# Patient Record
Sex: FEMALE | Race: BLACK OR AFRICAN AMERICAN | NOT HISPANIC OR LATINO | Employment: FULL TIME | URBAN - METROPOLITAN AREA
[De-identification: names, ages, dates, MRNs, and addresses within clinical notes are randomized per-mention and may not be internally consistent; named-entity substitution may affect disease eponyms.]

---

## 2024-02-16 ENCOUNTER — OFFICE VISIT (OUTPATIENT)
Dept: OBSTETRICS AND GYNECOLOGY | Facility: CLINIC | Age: 34
End: 2024-02-16
Payer: COMMERCIAL

## 2024-02-16 ENCOUNTER — LAB VISIT (OUTPATIENT)
Dept: LAB | Facility: HOSPITAL | Age: 34
End: 2024-02-16
Attending: NURSE PRACTITIONER
Payer: COMMERCIAL

## 2024-02-16 VITALS
WEIGHT: 129.19 LBS | DIASTOLIC BLOOD PRESSURE: 72 MMHG | BODY MASS INDEX: 22.06 KG/M2 | HEIGHT: 64 IN | SYSTOLIC BLOOD PRESSURE: 122 MMHG

## 2024-02-16 DIAGNOSIS — Z01.419 ROUTINE GYNECOLOGICAL EXAMINATION: Primary | ICD-10-CM

## 2024-02-16 DIAGNOSIS — Z12.4 PAPANICOLAOU SMEAR FOR CERVICAL CANCER SCREENING: ICD-10-CM

## 2024-02-16 DIAGNOSIS — Z30.014 ENCOUNTER FOR INITIAL PRESCRIPTION OF INTRAUTERINE CONTRACEPTIVE DEVICE (IUD): ICD-10-CM

## 2024-02-16 DIAGNOSIS — Z11.3 SCREEN FOR STD (SEXUALLY TRANSMITTED DISEASE): ICD-10-CM

## 2024-02-16 PROCEDURE — 3008F BODY MASS INDEX DOCD: CPT | Mod: CPTII,S$GLB,, | Performed by: NURSE PRACTITIONER

## 2024-02-16 PROCEDURE — 80074 ACUTE HEPATITIS PANEL: CPT | Performed by: NURSE PRACTITIONER

## 2024-02-16 PROCEDURE — 36415 COLL VENOUS BLD VENIPUNCTURE: CPT | Performed by: NURSE PRACTITIONER

## 2024-02-16 PROCEDURE — 87491 CHLMYD TRACH DNA AMP PROBE: CPT | Performed by: NURSE PRACTITIONER

## 2024-02-16 PROCEDURE — 3078F DIAST BP <80 MM HG: CPT | Mod: CPTII,S$GLB,, | Performed by: NURSE PRACTITIONER

## 2024-02-16 PROCEDURE — 86592 SYPHILIS TEST NON-TREP QUAL: CPT | Performed by: NURSE PRACTITIONER

## 2024-02-16 PROCEDURE — 87389 HIV-1 AG W/HIV-1&-2 AB AG IA: CPT | Performed by: NURSE PRACTITIONER

## 2024-02-16 PROCEDURE — 99999 PR PBB SHADOW E&M-NEW PATIENT-LVL III: CPT | Mod: PBBFAC,,, | Performed by: NURSE PRACTITIONER

## 2024-02-16 PROCEDURE — 87624 HPV HI-RISK TYP POOLED RSLT: CPT | Performed by: NURSE PRACTITIONER

## 2024-02-16 PROCEDURE — 1160F RVW MEDS BY RX/DR IN RCRD: CPT | Mod: CPTII,S$GLB,, | Performed by: NURSE PRACTITIONER

## 2024-02-16 PROCEDURE — 88175 CYTOPATH C/V AUTO FLUID REDO: CPT | Performed by: NURSE PRACTITIONER

## 2024-02-16 PROCEDURE — 3074F SYST BP LT 130 MM HG: CPT | Mod: CPTII,S$GLB,, | Performed by: NURSE PRACTITIONER

## 2024-02-16 PROCEDURE — 1159F MED LIST DOCD IN RCRD: CPT | Mod: CPTII,S$GLB,, | Performed by: NURSE PRACTITIONER

## 2024-02-16 PROCEDURE — 99385 PREV VISIT NEW AGE 18-39: CPT | Mod: S$GLB,,, | Performed by: NURSE PRACTITIONER

## 2024-02-16 NOTE — PROGRESS NOTES
"  Subjective:       Patient ID: Joseph Oropeza is a 33 y.o. female.    Chief Complaint:  Annual Exam      History of Present Illness  HPI  Desires to have iud removed and reinserted (Kyleena)   Health Maintenance   Topic Date Due    Hepatitis C Screening  Never done    Lipid Panel  Never done    TETANUS VACCINE  Never done     GYN & OB History  No LMP recorded. Patient has had an implant.   Date of Last Pap: today     OB History    Para Term  AB Living   1 1 1     1   SAB IAB Ectopic Multiple Live Births                  # Outcome Date GA Lbr Maged/2nd Weight Sex Delivery Anes PTL Lv   1 Term                Review of Systems  Review of Systems        Objective:   /72   Ht 5' 4" (1.626 m)   Wt 58.6 kg (129 lb 3 oz)   BMI 22.18 kg/m²    Physical Exam:   Constitutional: She is oriented to person, place, and time. She appears well-developed and well-nourished.        Pulmonary/Chest: Right breast exhibits no inverted nipple, no mass, no nipple discharge, no skin change, no tenderness and no swelling. Left breast exhibits no inverted nipple, no mass, no nipple discharge, no skin change, no tenderness and no swelling.        Abdominal: Soft.     Genitourinary:    Inguinal canal and vagina normal.      Pelvic exam was performed with patient supine.   The external female genitalia was normal.   Genitalia hair distrobution normal .     Labial bartholins normal.Cervix is normal. No erythema, vaginal discharge, bleeding, rectocele, cystocele or prolapse of vaginal walls in the vagina.    No foreign body in the vagina.      No signs of injury in the vagina.      pap smear completedIUD strings visualized.              Neurological: She is alert and oriented to person, place, and time.    Skin: Skin is warm and dry.    Psychiatric: She has a normal mood and affect. Her behavior is normal. Judgment and thought content normal.        Assessment:        1. Routine gynecological examination    2. Papanicolaou " smear for cervical cancer screening    3. Screen for STD (sexually transmitted disease)    4. Encounter for initial prescription of intrauterine contraceptive device (IUD)                Plan:            Joseph was seen today for annual exam.    Diagnoses and all orders for this visit:    Routine gynecological examination    Papanicolaou smear for cervical cancer screening  -     Liquid-Based Pap Smear, Screening  -     HPV High Risk Genotypes, PCR    Screen for STD (sexually transmitted disease)  -     C. trachomatis/N. gonorrhoeae by AMP DNA [facility]; Vagina  -     HIV 1/2 Ag/Ab (4th Gen); Future  -     RPR; Future  -     Hepatitis Panel, Acute; Future    Encounter for initial prescription of intrauterine contraceptive device (IUD)  -     Device Authorization Order    Return to clinic in one year for WWE

## 2024-02-17 LAB
HAV IGM SERPL QL IA: NORMAL
HBV CORE IGM SERPL QL IA: NORMAL
HBV SURFACE AG SERPL QL IA: NORMAL
HCV AB SERPL QL IA: NORMAL
HIV 1+2 AB+HIV1 P24 AG SERPL QL IA: NORMAL
RPR SER QL: NORMAL

## 2024-02-18 LAB
C TRACH DNA SPEC QL NAA+PROBE: NOT DETECTED
N GONORRHOEA DNA SPEC QL NAA+PROBE: NOT DETECTED

## 2024-02-20 DIAGNOSIS — Z30.014 ENCOUNTER FOR INITIAL PRESCRIPTION OF INTRAUTERINE CONTRACEPTIVE DEVICE (IUD): Primary | ICD-10-CM

## 2024-02-21 ENCOUNTER — TELEPHONE (OUTPATIENT)
Dept: OBSTETRICS AND GYNECOLOGY | Facility: CLINIC | Age: 34
End: 2024-02-21
Payer: COMMERCIAL

## 2024-02-21 ENCOUNTER — PATIENT MESSAGE (OUTPATIENT)
Dept: OBSTETRICS AND GYNECOLOGY | Facility: CLINIC | Age: 34
End: 2024-02-21
Payer: COMMERCIAL

## 2024-02-21 LAB
FINAL PATHOLOGIC DIAGNOSIS: NORMAL
Lab: NORMAL

## 2024-02-21 NOTE — TELEPHONE ENCOUNTER
----- Message from Barbara Murry NP sent at 2/21/2024 12:51 PM CST -----  Contact: pt  Please inform client that her request was denied due to Facility/POS Not in Patient's Network, other than that there is nothing else I can do resubmitting it will not get this PA approved.   ----- Message -----  From: Karen Thomason  Sent: 2/21/2024  12:04 PM CST  To: Jeanmarie Jimenez Staff    Pt is calling in regard to piggyback on the last message sent, pt spoke with the Pre Cert of [facility] and they stated that the PA was sent in for removal and insert of the IUD.  But they said that it had to be 2 PA's sent in, 1 for removal and 1 for insertion, please re submit.  If any questions, please call pt back to discuss at [phone] thanks/mpd

## 2024-02-21 NOTE — TELEPHONE ENCOUNTER
----- Message from Karen Thomason sent at 2/21/2024 11:10 AM CST -----  Contact: Darrian Colmenares is calling in regard to the pt being told she needed an PA for the removal and insertion of her IUD by Barbara Murry.  Darrian is stating that they don't require a PA and also, the pt was told that one was filed, but [facility] has no record of a PA on file.  Also, Darrian states that the provide and facility are both in network.  Please call [phone] select provider side.  Please contact pt to advise an update at  [phone] thanks/mpd

## 2024-02-23 ENCOUNTER — TELEPHONE (OUTPATIENT)
Dept: OBSTETRICS AND GYNECOLOGY | Facility: CLINIC | Age: 34
End: 2024-02-23
Payer: COMMERCIAL

## 2024-02-23 NOTE — TELEPHONE ENCOUNTER
----- Message from Hillary Oseguera sent at 2/23/2024  1:03 PM CST -----  Regarding: Medical Advice  Contact: roque  Type:  Needs Medical Advice    Who Called: durga   Symptoms (please be specific):    How long has patient had these symptoms:    Pharmacy name and phone #:    Would the patient rather a call back or a response via [facility]? call  Best Call Back Number: [phone]  Additional Information:  Joseph was seen by the provider in February for IUD removal and replacement. Benji from the insurance company is stating an auth is not required for the procedure and device; and she can confirm the coverage. Ref#[id number]

## 2024-02-23 NOTE — TELEPHONE ENCOUNTER
Joseph was seen by the provider in February for IUD removal and replacement. Benji from the insurance company is stating an auth is not required for the procedure and device; and she can confirm the coverage. Ref#[id number]

## 2024-02-25 LAB
HPV HR 12 DNA SPEC QL NAA+PROBE: NEGATIVE
HPV16 AG SPEC QL: NEGATIVE
HPV18 DNA SPEC QL NAA+PROBE: NEGATIVE

## 2024-02-26 ENCOUNTER — TELEPHONE (OUTPATIENT)
Dept: OBSTETRICS AND GYNECOLOGY | Facility: CLINIC | Age: 34
End: 2024-02-26
Payer: COMMERCIAL

## 2024-02-26 NOTE — TELEPHONE ENCOUNTER
----- Message from Priscilla Dumas sent at 2/26/2024  2:19 PM CST -----  Contact: britany  Patient is requesting a call to discuss IUD prior authorization that was denied. Please call her back at [phone].      Thanks  DD

## 2024-02-26 NOTE — TELEPHONE ENCOUNTER
I called the pt identified her by two pt identifier. She wanted to know why her IUD was denied. Pt states she spoke with her insurance company and they state the provider was in her network and we don't need a pre-authorization for a IUD. I let the pt know that we didn't need a pre authorization; we did a referral to get the IUD approved. I check her IUD is now approve. Pt will be scheduled to get her IUD tomorrow once the provider returns.

## 2024-03-07 ENCOUNTER — TELEPHONE (OUTPATIENT)
Dept: OBSTETRICS AND GYNECOLOGY | Facility: CLINIC | Age: 34
End: 2024-03-07
Payer: COMMERCIAL

## 2024-03-07 NOTE — TELEPHONE ENCOUNTER
Referral Notes  Number of Notes: 5  .  Type Date User Summary Attachment   Authorization Information 02/22/2024  2:56 PM Tessa Mccarty   Approved  Primary Insurance: [facility] [employer] [facility] ALL OUT OF STATE    Authorization #: NPR  Number of visits approved: 1  Approved codes Precertification not required codes Codes not approved         , 69562, 27838      Diagnoses:    Z30. -   Note:     Approved   Primary Insurance: [employer] [facility]/[employer] ALL OUT OF STATE      Authorization #: NPR  Number of visits approved: 1   Approved codes Precertification not required codes Codes not approved            , 28388, 98501           Diagnoses:    Z30.014 (ICD-10-CM) - Encounter for initial prescription of intrauterine contraceptive device (IUD)         Procedures:     MHI960 - DEVICE AUTHORIZATIONS      - DC LEVONORGESTREL-REL INTRAUTERINE CONTRACEPT SYS, (KYLEENA) 19.5 MG     33920 (CPT®) - DC INSERT INTRAUTERINE DEVICE     62547 (CPT®) - DC REMOVE, INTRAUTERINE DEVICE   Precert required? NO     Called patient and referral has been approved.  Appointment scheduled.

## 2024-03-07 NOTE — TELEPHONE ENCOUNTER
----- Message from Kellie Sergio sent at 3/7/2024  2:06 PM CST -----  Contact: self [phone]  Patient called in this afternoon stating she has been trying for 2 weeks to get an appointment to get her IUD removed and reinserted. She also states she spoke with her insurance company and was told she does not need a PA. The IUD is about to  and she would like someone to schedule her ap appointment as she does not know what will happen once it does. Please call back [phone]. Thanks victoria

## 2024-03-07 NOTE — TELEPHONE ENCOUNTER
Called patient to reschedule her appointment for 3/14/2024 at 1:30pm with Ms Jimenez. Patient accepted changes.

## 2024-03-14 ENCOUNTER — PROCEDURE VISIT (OUTPATIENT)
Dept: OBSTETRICS AND GYNECOLOGY | Facility: CLINIC | Age: 34
End: 2024-03-14
Payer: COMMERCIAL

## 2024-03-14 VITALS — WEIGHT: 132.5 LBS | BODY MASS INDEX: 22.62 KG/M2 | HEIGHT: 64 IN

## 2024-03-14 DIAGNOSIS — Z30.433 ENCOUNTER FOR IUD REMOVAL AND REINSERTION: Primary | ICD-10-CM

## 2024-03-14 PROCEDURE — 58300 INSERT INTRAUTERINE DEVICE: CPT | Mod: S$GLB,,, | Performed by: NURSE PRACTITIONER

## 2024-03-14 PROCEDURE — 58301 REMOVE INTRAUTERINE DEVICE: CPT | Mod: S$GLB,,, | Performed by: NURSE PRACTITIONER

## 2024-03-14 NOTE — PROCEDURES
Removal and Insertion of Intrauterine Device    Date/Time: 3/14/2024 1:30 PM    Performed by: Barbara Murry NP  Authorized by: Barbara Murry NP    Consent:     Consent obtained:  Prior to procedure the appropriate consent was completed and verified    Consent given by:  Patient    Procedure risks and benefits discussed: yes      Patient questions answered: yes      Patient agrees, verbalizes understanding, and wants to proceed: yes     Device to be inserted was verified by patient: yes    Educational handouts given: yes      Instructions and paperwork completed: yes    Removal Procedure:    IUD grasped by: ring forceps   Removed with no complications: IUD removal not due to complicationsnono   Removed due to expiration: Removal due to expiration  Insertion Procedure:   17.5 mcg levonorgestreL 17.5 mcg/24 hrs (5 yrs) 19.5 mg       Pelvic exam performed: yes      Negative GC/chlamydia test: no      Negative urine pregnancy test: no      Negative serum pregnancy test: no      Cervix cleaned and prepped: yes      Speculum placed in vagina: yes      Tenaculum applied to cervix: yes      Uterus sounded: yes      Uterus sound depth (cm):  7    IUD inserted with no complications: yes      IUD type:  Kyleena    Strings trimmed: yes    Post-procedure:     Patient tolerated procedure well: yes      Patient will follow up after next period: yes

## 2024-04-19 ENCOUNTER — PATIENT MESSAGE (OUTPATIENT)
Dept: RESEARCH | Facility: HOSPITAL | Age: 34
End: 2024-04-19
Payer: COMMERCIAL